# Patient Record
Sex: MALE | Race: WHITE | Employment: FULL TIME | URBAN - METROPOLITAN AREA
[De-identification: names, ages, dates, MRNs, and addresses within clinical notes are randomized per-mention and may not be internally consistent; named-entity substitution may affect disease eponyms.]

---

## 2023-07-14 ENCOUNTER — OFFICE VISIT (OUTPATIENT)
Dept: FAMILY MEDICINE CLINIC | Age: 43
End: 2023-07-14
Payer: COMMERCIAL

## 2023-07-14 VITALS
WEIGHT: 242.4 LBS | HEIGHT: 61 IN | BODY MASS INDEX: 45.76 KG/M2 | SYSTOLIC BLOOD PRESSURE: 138 MMHG | DIASTOLIC BLOOD PRESSURE: 92 MMHG | TEMPERATURE: 98.5 F | HEART RATE: 96 BPM | OXYGEN SATURATION: 98 %

## 2023-07-14 DIAGNOSIS — K04.7 TOOTH INFECTION: Primary | ICD-10-CM

## 2023-07-14 PROCEDURE — 99203 OFFICE O/P NEW LOW 30 MIN: CPT | Performed by: NURSE PRACTITIONER

## 2023-07-14 RX ORDER — AMOXICILLIN AND CLAVULANATE POTASSIUM 875; 125 MG/1; MG/1
1 TABLET, FILM COATED ORAL 2 TIMES DAILY
Qty: 20 TABLET | Refills: 0 | Status: SHIPPED | OUTPATIENT
Start: 2023-07-14 | End: 2023-07-24

## 2023-07-14 ASSESSMENT — ENCOUNTER SYMPTOMS
SHORTNESS OF BREATH: 0
CHEST TIGHTNESS: 0
NAUSEA: 0
SINUS PRESSURE: 0
SORE THROAT: 0
VOMITING: 0
DIARRHEA: 0
RHINORRHEA: 0
WHEEZING: 0
SINUS PAIN: 0
COUGH: 0

## 2024-08-27 LAB
ALBUMIN: 4.7 G/DL
ALP BLD-CCNC: 51 U/L
ALT SERPL-CCNC: 12 U/L
ANION GAP SERPL CALCULATED.3IONS-SCNC: 2.6 MMOL/L
AST SERPL-CCNC: 10 U/L
BILIRUB SERPL-MCNC: 0.4 MG/DL (ref 0.1–1.4)
BUN BLDV-MCNC: 16 MG/DL
CALCIUM SERPL-MCNC: 9.4 MG/DL
CHLORIDE BLD-SCNC: 104 MMOL/L
CHOLESTEROL, TOTAL: 189 MG/DL
CHOLESTEROL/HDL RATIO: ABNORMAL
CO2: 24 MMOL/L
CREAT SERPL-MCNC: 1.2 MG/DL
GFR, ESTIMATED: 78
GLUCOSE BLD-MCNC: 103 MG/DL
HDLC SERPL-MCNC: 33 MG/DL (ref 35–70)
LDL CHOLESTEROL: 123
NONHDLC SERPL-MCNC: ABNORMAL MG/DL
POTASSIUM SERPL-SCNC: 4.2 MMOL/L
SODIUM BLD-SCNC: 139 MMOL/L
TOTAL PROTEIN: 6.5 G/DL (ref 6.4–8.2)
TRIGL SERPL-MCNC: 166 MG/DL
VLDLC SERPL CALC-MCNC: 33 MG/DL

## 2024-11-20 ENCOUNTER — INITIAL CONSULT (OUTPATIENT)
Dept: CARDIOLOGY CLINIC | Age: 44
End: 2024-11-20

## 2024-11-20 VITALS
BODY MASS INDEX: 34.22 KG/M2 | DIASTOLIC BLOOD PRESSURE: 80 MMHG | SYSTOLIC BLOOD PRESSURE: 112 MMHG | HEART RATE: 70 BPM | WEIGHT: 239 LBS | HEIGHT: 70 IN

## 2024-11-20 DIAGNOSIS — Z00.00 EXAMINATION: Primary | ICD-10-CM

## 2024-11-20 DIAGNOSIS — R42 DIZZINESS: ICD-10-CM

## 2024-11-20 RX ORDER — ERGOCALCIFEROL 1.25 MG/1
CAPSULE, LIQUID FILLED ORAL
COMMUNITY
Start: 2024-10-10

## 2024-11-20 NOTE — PATIENT INSTRUCTIONS
DIZZINESS: Patient is not orthostatic by measurements, his EKG is within normal limits.  Physical exam also does not suggest any significant abnormalities.  But because of significant, persistent and daily symptoms I will subject him to noninvasive evaluation.  I will be checking a Holter monitor, carotid ultrasound, echocardiographic study and treadmill stress test studies for risk stratification as well as heart is concerned.  Further management plans to be based on test results.    I spent about 30 min. of time in review of the available data, chart Prep., interviewing patient, obtaining history, performing physical exam, going through decision making analysis for assessment & plans of management on this patient.    Office Visit for test results.

## 2024-11-20 NOTE — PROGRESS NOTES
CARDIAC CONSULT NOTE       Samuel ROBERT  43 y.o.  male    Chief Complaint   Patient presents with    Consultation     Pt states they have been dealing with dizziness for 3 months now. He said he gets palpitations a couple of times a year due to anxiety, but not a huge concern. Otherwise pt denies cardiac sx.    Dizziness     Pt states he gets dizzy spells most days without passing out. Feels lightheaded most days.       Referring physician:  No primary care provider on file.     Primary care physician:  No primary care provider on file.    History of Present Illness:     Samuel ROBERT is a 43 y.o. male referred for evaluation and management of dizziness complaints going on for about 3 months.  Patient has symptoms of palpitations maybe once or twice an year but not in relationship to his dizziness.    Dizziness is happening almost on a daily basis lasting half an hour 45 minutes.  Denies any vertigo.  Denies any sinus problem or intermittently a problem.  No history of latricia syncope.  No other complaints such as chest pain or shortness of breath either.     has no past medical history on file.     has no past surgical history on file.     reports that he has quit smoking. His smoking use included cigarettes. He has never used smokeless tobacco. He reports that he does not currently use alcohol. He reports that he does not use drugs.    family history is not on file.    Review of Systems:   Cardiovascular: No chest pain, dyspnea on exertion, palpitations or loss of consciousness  Respiratory: No cough or wheezing    Musculoskeletal:  No gait disturbance, weakness, muscle cramps, aches & pains or joint complaints  Neurological: No TIA or stroke symptoms  Psychiatric: No anxiety or depression  Hematologic/Lymphatic: No bleeding problems, blood clots or swollen lymph nodes    Physical Examination:    /80 (Site: Left Upper Arm, Position: Supine, Cuff Size: Large Adult)   Pulse 70

## 2024-12-04 ENCOUNTER — TELEPHONE (OUTPATIENT)
Dept: CARDIOLOGY CLINIC | Age: 44
End: 2024-12-04

## 2024-12-04 NOTE — TELEPHONE ENCOUNTER
Notified       Echo (TTE) complete     Left Ventricle: Normal left ventricular systolic function with a visually estimated EF of 55 - 60%. Left ventricle size is normal. Mildly increased wall thickness. Normal wall motion. Grade I diastolic dysfunction with normal LAP.    No significant valvular disease or regurgitation noted.    Pericardium: No pericardial effusion.      Vascular US Duplex Carotid Bilateral       No stenosis in the right internal carotid artery.    No stenosis in the left internal carotid artery.    Normal antegrade flow involving the right vertebral artery.    Normal antegrade flow involving the left vertebral artery.

## 2024-12-09 ENCOUNTER — TELEPHONE (OUTPATIENT)
Dept: CARDIOLOGY CLINIC | Age: 44
End: 2024-12-09

## 2024-12-10 ENCOUNTER — OFFICE VISIT (OUTPATIENT)
Dept: CARDIOLOGY CLINIC | Age: 44
End: 2024-12-10
Payer: COMMERCIAL

## 2024-12-10 VITALS
BODY MASS INDEX: 34.07 KG/M2 | SYSTOLIC BLOOD PRESSURE: 116 MMHG | DIASTOLIC BLOOD PRESSURE: 78 MMHG | HEIGHT: 70 IN | HEART RATE: 80 BPM | WEIGHT: 238 LBS

## 2024-12-10 DIAGNOSIS — R42 DIZZINESS: Primary | ICD-10-CM

## 2024-12-10 PROCEDURE — 99213 OFFICE O/P EST LOW 20 MIN: CPT | Performed by: INTERNAL MEDICINE

## 2024-12-10 RX ORDER — IPRATROPIUM BROMIDE 21 UG/1
SPRAY, METERED NASAL
COMMUNITY

## 2024-12-10 RX ORDER — FLUTICASONE PROPIONATE 50 MCG
SPRAY, SUSPENSION (ML) NASAL
COMMUNITY
Start: 2024-10-16

## 2024-12-10 NOTE — PROGRESS NOTES
OFFICE PROGRESS NOTES      Samuel ROBERT is a 43 y.o. male who has    CHIEF COMPLAINT AS FOLLOWS:  CHEST PAIN: Patient denies any C/O chest pains at this time.      SOB:  Has SOB with exertion but no change over previous noted.             LEG EDEMA: No leg edema   PALPITATIONS: Denies any C/O Palpitations   DIZZINESS: Complains of lightheaded symptoms   SYNCOPE: None   OTHER/ ADDITIONAL COMPLAINTS:                                     HPI: Patient is here for F/U on his Dizziness problems.                   Current Outpatient Medications   Medication Sig Dispense Refill    fluticasone (FLONASE ALLERGY RELIEF) 50 MCG/ACT nasal spray Flonase      ipratropium (ATROVENT) 0.03 % nasal spray Nasal for 43 Days      vitamin D (ERGOCALCIFEROL) 1.25 MG (08429 UT) CAPS capsule        No current facility-administered medications for this visit.     Allergies: Sulfa antibiotics  Review of Systems:    Constitutional: Negative for diaphoresis and fatigue  Respiratory: Negative for shortness of breath  Cardiovascular: Negative for chest pain, dyspnea on exertion, claudication, edema, irregular heartbeat, murmur, palpitations or shortness of breath  Musculoskeletal: Negative for muscle pain, muscular weakness, negative for pain in arm and leg or swelling in foot and leg    Objective:  /78 (Site: Left Upper Arm, Position: Sitting, Cuff Size: Large Adult)   Pulse 80   Ht 1.778 m (5' 10\")   Wt 108 kg (238 lb)   BMI 34.15 kg/m²   Wt Readings from Last 3 Encounters:   12/10/24 108 kg (238 lb)   12/03/24 108.4 kg (239 lb)   11/20/24 108.4 kg (239 lb)     Body mass index is 34.15 kg/m².  GENERAL - Alert, oriented, pleasant, in no apparent distress.  EYES: No jaundice, no conjunctival pallor.  Neck - Supple.  No jugular venous distention noted. No carotid bruits.   Cardiovascular - Normal S1 and S2 without obvious murmur or gallop.    Extremities - No cyanosis, clubbing, or significant edema.    Pulmonary - No respiratory distress.  No

## 2024-12-10 NOTE — PATIENT INSTRUCTIONS
DIZZINESS: Patient is not orthostatic by measurements, his EKG is within normal limits.  Physical exam also does not suggest any significant abnormalities.    noninvasive Cardiac evaluation is within normal limits.      11/20/2024 Holter monitor  No significant arrhythmias recorded on this Holter monitoring.  Baseline rhythm mechanism appears to be normal sinus with average heart rate of 78 bpm.  Slowest heart rate was recorded at 42 bpm at 5:52 AM probably during sleep and fastest at 121 bpm with an episode of sinus tachycardia.  No clinically significant arrhythmias noted.    12/03/2024 carotid ultrasound    No stenosis in the right internal carotid artery.    No stenosis in the left internal carotid artery.    Normal antegrade flow involving the right vertebral artery.    Normal antegrade flow involving the left vertebral artery.    12/03/2024 echocardiogram    Left Ventricle: Normal left ventricular systolic function with a visually estimated EF of 55 - 60%. Left ventricle size is normal. Mildly increased wall thickness. Normal wall motion. Grade I diastolic dysfunction with normal LAP.    No significant valvular disease or regurgitation noted.    Pericardium: No pericardial effusion.     12/09/2024  This is a normal treadmill stress test.  Patient baseline EKG reveals normal sinus rhythm at 92 bpm and EKG appears to be normal.  Patient exercised for a total of 7 minutes on Les protocol achieving a total workload of 8 METS.  Patient complained of feeling little dizzy after the exercise.  No complaints of chest pain noted.  EKG tracings did not reveal any diagnostic ischemic changes and there were no arrhythmias seen.  Physiological blood pressure response to exercise seen.    TESTS ORDERED: None this visit     PREVIOUSLY ORDERED TESTS REVIEWED & DISCUSSED WITH THE PATIENT:     I personally reviewed & interpreted, all previously ordered tests as copied above. Latest Labs are pulled in to the note with dates.